# Patient Record
Sex: FEMALE | Race: WHITE | NOT HISPANIC OR LATINO | URBAN - METROPOLITAN AREA
[De-identification: names, ages, dates, MRNs, and addresses within clinical notes are randomized per-mention and may not be internally consistent; named-entity substitution may affect disease eponyms.]

---

## 2017-03-27 ENCOUNTER — OFFICE VISIT - HEALTHEAST (OUTPATIENT)
Dept: FAMILY MEDICINE | Facility: CLINIC | Age: 42
End: 2017-03-27

## 2017-03-27 DIAGNOSIS — G47.00 INSOMNIA: ICD-10-CM

## 2017-03-27 DIAGNOSIS — B07.0 VERRUCA PLANTARIS: ICD-10-CM

## 2017-03-27 DIAGNOSIS — Z30.9 CONTRACEPTIVE MANAGEMENT: ICD-10-CM

## 2017-03-27 DIAGNOSIS — Z00.00 WELL ADULT EXAM: ICD-10-CM

## 2017-03-27 ASSESSMENT — MIFFLIN-ST. JEOR: SCORE: 1141.51

## 2017-04-25 ENCOUNTER — OFFICE VISIT - HEALTHEAST (OUTPATIENT)
Dept: FAMILY MEDICINE | Facility: CLINIC | Age: 42
End: 2017-04-25

## 2017-04-25 DIAGNOSIS — B07.0 VERRUCA PLANTARIS: ICD-10-CM

## 2017-04-25 ASSESSMENT — MIFFLIN-ST. JEOR: SCORE: 1144.41

## 2017-05-16 ENCOUNTER — COMMUNICATION - HEALTHEAST (OUTPATIENT)
Dept: SCHEDULING | Facility: CLINIC | Age: 42
End: 2017-05-16

## 2017-06-02 ENCOUNTER — OFFICE VISIT - HEALTHEAST (OUTPATIENT)
Dept: FAMILY MEDICINE | Facility: CLINIC | Age: 42
End: 2017-06-02

## 2017-06-02 DIAGNOSIS — N92.0 EXCESSIVE OR FREQUENT MENSTRUATION: ICD-10-CM

## 2017-06-02 ASSESSMENT — MIFFLIN-ST. JEOR: SCORE: 1145.77

## 2017-06-14 ENCOUNTER — OFFICE VISIT - HEALTHEAST (OUTPATIENT)
Dept: FAMILY MEDICINE | Facility: CLINIC | Age: 42
End: 2017-06-14

## 2017-06-14 DIAGNOSIS — Z30.017 NEXPLANON INSERTION: ICD-10-CM

## 2017-06-14 ASSESSMENT — MIFFLIN-ST. JEOR: SCORE: 1143.04

## 2017-08-25 ENCOUNTER — COMMUNICATION - HEALTHEAST (OUTPATIENT)
Dept: SCHEDULING | Facility: CLINIC | Age: 42
End: 2017-08-25

## 2017-12-04 ENCOUNTER — OFFICE VISIT - HEALTHEAST (OUTPATIENT)
Dept: FAMILY MEDICINE | Facility: CLINIC | Age: 42
End: 2017-12-04

## 2017-12-04 DIAGNOSIS — Z30.09 GENERAL COUNSELING AND ADVICE FOR CONTRACEPTIVE MANAGEMENT: ICD-10-CM

## 2017-12-04 DIAGNOSIS — Z30.46 ENCOUNTER FOR NEXPLANON REMOVAL: ICD-10-CM

## 2017-12-04 ASSESSMENT — MIFFLIN-ST. JEOR: SCORE: 1145.77

## 2017-12-19 ENCOUNTER — COMMUNICATION - HEALTHEAST (OUTPATIENT)
Dept: SCHEDULING | Facility: CLINIC | Age: 42
End: 2017-12-19

## 2017-12-20 ENCOUNTER — COMMUNICATION - HEALTHEAST (OUTPATIENT)
Dept: FAMILY MEDICINE | Facility: CLINIC | Age: 42
End: 2017-12-20

## 2017-12-20 DIAGNOSIS — R29.898 UPPER EXTREMITY WEAKNESS: ICD-10-CM

## 2018-03-29 ENCOUNTER — OFFICE VISIT - HEALTHEAST (OUTPATIENT)
Dept: FAMILY MEDICINE | Facility: CLINIC | Age: 43
End: 2018-03-29

## 2018-03-29 DIAGNOSIS — Z12.31 ENCOUNTER FOR SCREENING MAMMOGRAM FOR BREAST CANCER: ICD-10-CM

## 2018-03-29 DIAGNOSIS — Z00.00 WELL FEMALE EXAM WITHOUT GYNECOLOGICAL EXAM: ICD-10-CM

## 2018-03-29 DIAGNOSIS — F41.1 GENERALIZED ANXIETY DISORDER: ICD-10-CM

## 2018-03-29 ASSESSMENT — MIFFLIN-ST. JEOR: SCORE: 1145.77

## 2018-05-18 ENCOUNTER — OFFICE VISIT - HEALTHEAST (OUTPATIENT)
Dept: FAMILY MEDICINE | Facility: CLINIC | Age: 43
End: 2018-05-18

## 2018-05-18 DIAGNOSIS — J30.2 SEASONAL ALLERGIES: ICD-10-CM

## 2018-05-18 DIAGNOSIS — N92.0 SPOTTING BETWEEN MENSES: ICD-10-CM

## 2018-05-18 LAB
HCG UR QL: NEGATIVE
SP GR UR STRIP: 1.01 (ref 1–1.03)

## 2018-05-18 ASSESSMENT — MIFFLIN-ST. JEOR: SCORE: 1141.23

## 2018-05-24 ENCOUNTER — COMMUNICATION - HEALTHEAST (OUTPATIENT)
Dept: FAMILY MEDICINE | Facility: CLINIC | Age: 43
End: 2018-05-24

## 2018-05-24 DIAGNOSIS — Z12.83 SKIN CANCER SCREENING: ICD-10-CM

## 2018-05-31 ENCOUNTER — RECORDS - HEALTHEAST (OUTPATIENT)
Dept: MAMMOGRAPHY | Facility: CLINIC | Age: 43
End: 2018-05-31

## 2018-05-31 DIAGNOSIS — Z12.31 ENCOUNTER FOR SCREENING MAMMOGRAM FOR MALIGNANT NEOPLASM OF BREAST: ICD-10-CM

## 2018-09-17 ENCOUNTER — COMMUNICATION - HEALTHEAST (OUTPATIENT)
Dept: FAMILY MEDICINE | Facility: CLINIC | Age: 43
End: 2018-09-17

## 2018-10-04 ENCOUNTER — OFFICE VISIT - HEALTHEAST (OUTPATIENT)
Dept: FAMILY MEDICINE | Facility: CLINIC | Age: 43
End: 2018-10-04

## 2018-10-04 DIAGNOSIS — F41.1 GENERALIZED ANXIETY DISORDER: ICD-10-CM

## 2018-10-04 ASSESSMENT — MIFFLIN-ST. JEOR: SCORE: 1150.3

## 2019-10-27 ENCOUNTER — COMMUNICATION - HEALTHEAST (OUTPATIENT)
Dept: FAMILY MEDICINE | Facility: CLINIC | Age: 44
End: 2019-10-27

## 2019-10-27 DIAGNOSIS — Z00.00 WELL FEMALE EXAM WITHOUT GYNECOLOGICAL EXAM: ICD-10-CM

## 2019-10-28 RX ORDER — NORETHINDRONE ACETATE AND ETHINYL ESTRADIOL .02; 1 MG/1; MG/1
1 TABLET ORAL DAILY
Qty: 30 TABLET | Refills: 0 | Status: SHIPPED | OUTPATIENT
Start: 2019-10-28

## 2020-08-09 ENCOUNTER — COMMUNICATION - HEALTHEAST (OUTPATIENT)
Dept: FAMILY MEDICINE | Facility: CLINIC | Age: 45
End: 2020-08-09

## 2020-08-09 DIAGNOSIS — Z00.00 WELL FEMALE EXAM WITHOUT GYNECOLOGICAL EXAM: ICD-10-CM

## 2021-01-04 ENCOUNTER — HEALTH MAINTENANCE LETTER (OUTPATIENT)
Age: 46
End: 2021-01-04

## 2021-01-18 ENCOUNTER — RECORDS - HEALTHEAST (OUTPATIENT)
Dept: ADMINISTRATIVE | Facility: OTHER | Age: 46
End: 2021-01-18

## 2021-05-30 VITALS — BODY MASS INDEX: 20.32 KG/M2 | HEIGHT: 63 IN | WEIGHT: 114.7 LBS

## 2021-05-30 VITALS — BODY MASS INDEX: 20.21 KG/M2 | WEIGHT: 114.06 LBS | HEIGHT: 63 IN

## 2021-05-31 VITALS — WEIGHT: 114.4 LBS | BODY MASS INDEX: 20.27 KG/M2 | HEIGHT: 63 IN

## 2021-05-31 VITALS — BODY MASS INDEX: 20.38 KG/M2 | WEIGHT: 115 LBS | HEIGHT: 63 IN

## 2021-05-31 VITALS — HEIGHT: 63 IN | WEIGHT: 115 LBS | BODY MASS INDEX: 20.38 KG/M2

## 2021-06-01 VITALS — WEIGHT: 115 LBS | BODY MASS INDEX: 20.38 KG/M2 | HEIGHT: 63 IN

## 2021-06-01 VITALS — HEIGHT: 63 IN | BODY MASS INDEX: 20.2 KG/M2 | WEIGHT: 114 LBS

## 2021-06-02 VITALS — WEIGHT: 116 LBS | HEIGHT: 63 IN | BODY MASS INDEX: 20.55 KG/M2

## 2021-06-02 NOTE — TELEPHONE ENCOUNTER
RN cannot approve Refill Request    RN can NOT refill this medication PCP messaged that patient is overdue for Office Visit. Last office visit: 5/18/2018 Kim Payton MD Last Physical: Visit date not found Last MTM visit: Visit date not found Last visit same specialty: 10/4/2018 Adriana Miguel MD.  Next visit within 3 mo: Visit date not found  Next physical within 3 mo: Visit date not found      Rose Lagunas, [facility] Triage/Med Refill 10/27/2019    Requested Prescriptions   Pending Prescriptions Disp Refills     norethindrone-ethinyl estradiol (MICROGESTIN 1/20) 1-20 mg-mcg per tablet [Pharmacy Med Name: NORETHINDRONE ACET/ETH 1/20 TB 21'S] 84 tablet 0     Sig: TAKE 1 TABLET BY MOUTH DAILY       Oral Contraceptives Protocol Failed - 10/27/2019  3:31 AM        Failed - Visit with PCP or prescribing provider visit in last 12 months      Last office visit with prescriber/PCP: 5/18/2018 Kim Payton MD OR same dept: Visit date not found OR same specialty: 10/4/2018 Adriana Miguel MD  Last physical: Visit date not found Last MTM visit: Visit date not found   Next visit within 3 mo: Visit date not found  Next physical within 3 mo: Visit date not found  Prescriber OR PCP: Kim Payton MD  Last diagnosis associated with med order: There are no diagnoses linked to this encounter.  If protocol passes may refill for 12 months if within 3 months of last provider visit (or a total of 15 months).

## 2021-06-02 NOTE — TELEPHONE ENCOUNTER
1st attempt to contact patient    Left message to call back for: Taylor    Information to relay to patient:  LMTCB      Please advise patient a one month supply of her medication was provided. She is overdue for a physical so please assist patient in scheduling, she will need a visit for additional refills.    Grace Major, CMA

## 2021-06-02 NOTE — TELEPHONE ENCOUNTER
3rd attempt to contact pt I checked her MyChart and she has not read the 2 messages sent or returned our calls would you like to pend a letter for me to send?    Or I can send a quality letter if youd like me to

## 2021-06-03 NOTE — TELEPHONE ENCOUNTER
Patient Returning Call  Reason for call:  Patient called back.  Information relayed to patient:  Informed of message listed below.  Patient states she has new insurance and is going to a different clinic and provider not in [facility] any more.  Patient has additional questions:  No  If YES, what are your questions/concerns:    Okay to leave a detailed message?: No call back needed

## 2021-06-09 NOTE — PROGRESS NOTES
Assessment/ Plan:      1. Well adult exam  Healthy female exam completed today.  Labs were not updated as they were done last year and were within normal limits.  She will not be due for a repeat Pap until 2021.  She was encouraged to continue with healthy lifestyle modifications that she has been participating in.  She will follow-up in 1 year for annual exam or sooner as needed.    2. Contraceptive management  Patient will be started on MonoNessa birth control.  She does not have a medical history of migraines with aura, blood clotting, or family history of blood clotting.  I also discussed with patient that she may consider an IUD or Nexplanon.  She will let me know if she would like to see someone regarding these type of birth control.  I discussed the risks versus benefits of this birth control as well as potential side effects.  - norgestimate-ethinyl estradiol (MONONESSA, 28,) 0.25-35 mg-mcg per tablet; Take 1 tablet by mouth daily.  Dispense: 84 tablet; Refill: 3    3. Insomnia  I discussed with patient that establishing good sleep habits is essential in helping with sleep patterns.  I encouraged her to try to develop a routine and to avoid stimulating activities in the evening before immediately before she goes to bed.  I also encouraged her to try melatonin as needed to help induce sleep I discussed with her to start it at 2 mg and to not go greater than 10 mg.  I also discussed that her memory concerns could also be secondary to poor sleep.  I did discuss that her ability to concentrate is greatly affected by her them sleep that you get on a daily basis.    4. Verruca plantaris  One plantar wart was treated with 3 freeze and thaw cycles of liquid nitrogen.  The area was prepped with a alcohol swab and the callus was removed utilizing a #10 scalpel.  Patient tolerated the procedure well.  I discussed with patient to utilize a pumice stone as well as over-the-counter liquid wart remover and to follow-up in  3-4 weeks for second treatment.    Subjective:      Taylor Birmingham is a 41 y.o. female who presents for an annual exam. The patient is sexually active. The patient participates in regular exercise: yes. The patient reports that there is not domestic violence in her life. She has a couple concerns today. One is her period. She reports that she is having worsening symptoms around her period with ovulation and increase cramping. She denies history of migraines, blood clotting, or family history of blood clotting.    Sleep is also a concern. She reports that she had inconsistent sleeping. This does not occur daily but is bothersome a couple days a week. She is have trouble falling asleep and staying asleep. She has tried to regulate her sleeping habits but admits she is not as good at this as she should be. She has minimal caffiene intake during the day. She does report that the hour before she goes to bed she often will check her work email as well as clean up the house. She has 2 children ages 5 and 2. She has not tried melatonin or antihistamines.   Wart: On the botton of her left great toe. It is painful with walking. She would like this removed if possible.  And finally she has concerns regarding her memory.  She reports that her memory seemed off approximately a couple weeks ago she just did not feel like herself.  She easily forgot different things that recently occurred and she felt that she was in a continuous fog.  She does believe that this could be secondary to being a mom of 2 young kids and not getting consistent sleep. She does report that they have had intermittent upper respiratory infections over the last couple months as well.      Healthy Habits:   Regular Exercise: Yes  Sunscreen Use: Yes  Healthy Diet: Yes  Dental Visits Regularly: Yes  Seat Belt: Yes  Sexually active: Yes  Self Breast Exam Monthly:No  Hemoccults: No  Flex Sig: No  Colonoscopy: No  Lipid Profile: 03/25/16  Glucose Screen:  16  Prevention of Osteoporosis: No  Last Dexa: N/A  Guns at Home:  N/A      Immunization History   Administered Date(s) Administered     Influenza, inj, historic 10/09/2014, 10/01/2016     Tdap 2011, 2015     Immunization status: up to date and documented.    No exam data present    Gynecologic History  Patient's last menstrual period was 2017 (exact date).  Contraception: none  Last Pap: 16. Results were: normal  Last mammogram: NONE. Results were: na      OB History    Para Term  AB SAB TAB Ectopic Multiple Living   3    1 1    1      # Outcome Date GA Lbr Masoud/2nd Weight Sex Delivery Anes PTL Lv   3 SAB            2             1                Birth Comments: System Generated. Please review and update pregnancy details.          Current Outpatient Prescriptions   Medication Sig Dispense Refill     cholecalciferol, vitamin D3, (VITAMIN D3) 1,000 unit capsule Take 1,000 Units by mouth daily.       multivitamin therapeutic (THERAGRAN) tablet Take 1 tablet by mouth daily.       No current facility-administered medications for this visit.      Past Medical History:   Diagnosis Date     Diabetes mellitus     gestational diabetes - diet controlled     Mental disorder     anxiety - no longer taking medication for     Past Surgical History:   Procedure Laterality Date      SECTION, LOW TRANSVERSE  11     [place]  DELIVERY ONLY      Description:  Section;  Recorded: 2013;     Penicillins and Sulfa (sulfonamide antibiotics)  Family History   Problem Relation Age of Onset     No Medical Problems Mother      No Medical Problems Father      No Medical Problems Sister      Cancer Maternal Aunt      Cancer Paternal Aunt      Cancer Maternal Grandmother      ovarian     Heart disease Paternal Grandfather      Social History     Social History     Marital status:      Spouse name: N/A     Number of children: N/A     Years of education:  "N/A     Occupational History     Not on file.     Social History Main Topics     Smoking status: Never Smoker     Smokeless tobacco: Never Used     Alcohol use Yes     Drug use: No     Sexual activity: Yes     Partners: Male     Birth control/ protection: None     Other Topics Concern     Not on file     Social History Narrative       Review of Systems  General:  Denies problem  Eyes: Denies problem  Ears/Nose/Throat: Denies problem  Cardiovascular: Denies problem  Respiratory:  Denies problem  Gastrointestinal:  Denies problem  Genitourinary: Denies problem  Musculoskeletal:  Denies problem  Skin: Denies problem  Neurologic: Denies problem  Psychiatric: Denies problem  Endocrine: Denies problem  Heme/Lymphatic: Denies problem   Allergic/Immunologic: Denies problem        Objective:         Vitals:    03/27/17 0909   BP: 112/72   Pulse: 72   Resp: 16   Temp: 97.9  F (36.6  C)   TempSrc: Oral   Weight: 114 lb 1 oz (51.7 kg)   Height: 5' 3\" (1.6 m)     Body mass index is 20.21 kg/(m^2).    Physical Exam:  General Appearance: Alert, cooperative, no distress, appears stated age  Head: Normocephalic, without obvious abnormality, atraumatic  Eyes: PERRL, conjunctiva/corneas clear, EOM's intact  Ears: Normal TM's and external ear canals, both ears  Nose: Nares normal, septum midline,mucosa normal, no drainage  Throat: Lips, mucosa, and tongue normal; teeth and gums normal  Neck: Supple, symmetrical, trachea midline, no adenopathy;  thyroid: not enlarged, symmetric, no tenderness/mass/nodules  Back: Symmetric, no curvature, ROM normal, no CVA tenderness  Lungs: Clear to auscultation bilaterally, respirations unlabored  Breasts: No breast masses, tenderness, asymmetry, or nipple discharge.  Heart: Regular rate and rhythm, S1 and S2 normal, no murmur, rub, or gallop  Abdomen: Soft, non-tender, bowel sounds active all four quadrants,  no masses, no organomegaly  Pelvic:Not examined  Extremities: Extremities normal, atraumatic, " no cyanosis or edema. Small plantar wart on the left great toe is present. Thick callus is present over the wart.   Skin: Skin color, texture, turgor normal, no rashes or lesions  Lymph nodes: Cervical, supraclavicular, and axillary nodes normal  Neurologic: Normal

## 2021-06-10 ENCOUNTER — RECORDS - HEALTHEAST (OUTPATIENT)
Dept: ADMINISTRATIVE | Facility: OTHER | Age: 46
End: 2021-06-10

## 2021-06-10 NOTE — TELEPHONE ENCOUNTER
RN cannot approve Refill Request    RN can NOT refill this medication Protocol failed and NO refill given. Last office visit: 4/25/2017 Lexus Saldivar CNP Last Physical: 3/29/2018 Last MTM visit: Visit date not found Last visit same specialty: 10/4/2018 Adriana Miguel MD.  Next visit within 3 mo: Visit date not found  Next physical within 3 mo: Visit date not found      Danni Chauhan, [facility] Triage/Med Refill 8/10/2020    Requested Prescriptions   Pending Prescriptions Disp Refills     norethindrone-ethinyl estradiol (MICROGESTIN 1/20) 1-20 mg-mcg per tablet [Pharmacy Med Name: NORETHINDRONE ACET/ETH 1/20 TB 21'S] 21 tablet 0     Sig: TAKE 1 TABLET BY MOUTH DAILY       Oral Contraceptives Protocol Failed - 8/9/2020  1:03 PM        Failed - Visit with PCP or prescribing provider visit in last 12 months      Last office visit with prescriber/PCP: 4/25/2017 Lexus Saldivar CNP OR same dept: Visit date not found OR same specialty: 10/4/2018 Adriana Miguel MD  Last physical: 3/29/2018 Last MTM visit: Visit date not found   Next visit within 3 mo: Visit date not found  Next physical within 3 mo: Visit date not found  Prescriber OR PCP: Lexus Saldivar CNP  Last diagnosis associated with med order: 1. Well female exam without gynecological exam  - norethindrone-ethinyl estradiol (MICROGESTIN 1/20) 1-20 mg-mcg per tablet [Pharmacy Med Name: NORETHINDRONE ACET/ETH 1/20 TB 21'S]; TAKE 1 TABLET BY MOUTH DAILY  Dispense: 21 tablet; Refill: 0    If protocol passes may refill for 12 months if within 3 months of last provider visit (or a total of 15 months).

## 2021-06-10 NOTE — PROGRESS NOTES
"  Assessment/Plan:       1. Verruca plantaris  I personally 2 verruca plantaris were treated on the left great toe with 3 freeze thaw cycles with liquid nitroglycerin.  Area was prepped with alcohol swab and callus was removed with #10 blade scalpel. She tolerated procedure well.  I discussed follow-up with the patient.   1. Recommend allowing any blistering to heal in the next 3-5 days.   2. After any blistering is healed, recommend soaking the feet in warm water for 5 minutes daily.  3. Then use a pumice stone to scrape off the callous.  4. Then apply Salicylic acid (wart remover treatment) daily   5. Return to clinic in 3-4 weeks if not improving or worsening.        Subjective:      Taylor Birmingham is a 41 y.o. female who presents for plantar wart on left great toe. This was treated on 3/27/17 with 3 freeze thaw cycles.  She returns tonight to have this treated again.  She has been using a pumice stone to help decrease the callus as well as assuring good foot care.  She has also been utilizing salicylic acid.  No other concerns today.    The following portions of the patient's history were reviewed and updated as appropriate: allergies, current medications and problem list.    Review of Systems   Pertinent items are noted in HPI.      Objective:      /70 (Patient Site: Right Arm, Patient Position: Sitting, Cuff Size: Adult Regular)  Pulse 60  Resp 16  Ht 5' 3\" (1.6 m)  Wt 114 lb 11.2 oz (52 kg)  LMP 03/12/2017 (Exact Date)  BMI 20.32 kg/m2    General appearance: alert, appears stated age and cooperative  Skin: 2 verruca plantaris present on left great toe.  These were treated with 3 freeze thaw cycles after prepping the area with alcohol and using a #10 blade to remove the callus.  No other skin lesions present. no rashes.  "

## 2021-06-11 NOTE — PROGRESS NOTES
SUBJECTIVE:    HPI:    41 y.o. female presenting today for Nexplanon insertion.     She is right hand dominant.  She is  and has children.  She is not breastfeeding.  Current contraception: none  Last sexual activity: She denies unprotected sexual activity in past 2 weeks.     PMH:  No known contraindications to Nexplanon      No current or past history of thrombosis or thromboembolic disorders       No hepatic tumors or active liver disease       No undiagnosed abnormal genital bleeding       No known or suspected carcinoma of the breast or personal history of breast cancer       No hypersensitivity to any of the components of NEXPLANON      No known history of keloids    ROS: 10 point review of symptoms otherwise negative except as detailed in HPI.     OBJECTIVE:    LABS: UPT NEG (contraception start)      ASSESSMENT:   We reviewed the Nexplanon literature and counseling re full range of contraceptive options. She denies any contraindications to its use. We discussed that her bleeding profile will change. We discussed ACHES and encouraged her to call with any of those signs or symptoms. She is aware of 3 year effectiveness of this method.  She is aware of potential side effects including infection at site, intermenstrual bleeding, irregular bleeding, amenorrhea, need for minor surgical procedure to remove or more extensive if implant is deep    Appropriate candidate for Nexplanon    PLAN & PROCEDURE NOTE:  She elected to proceed with the placement after the risks and benefits were discussed. Denies allergy to local anesthesia, keloid formation.     Consent signed and will be scanned in EMR.     After cleansing left (non-dominant) arm above the elbow, 2 mL of 1% Lidocaine was administered along the planned injection site for Nexplanon. Insertion site cleansed with iodine. Nexplanon was then inserted 6cm above the medial epicondyle of the humerus, in the groove between the biceps and the triceps (sulcus  bicipitalis medialis). Palpation revealed subdermal placement; the patient was able to feel implant as well.     Bleeding was minimal and a pressure dressing was applied with instructions to leave this in place for 24 hours. Pt was instructed to observe for signs/symptoms of any infection (localized tenderness, pain, inflammation) or excessive bruising.  No apparent distress at completion of procedure. No complications.     Nexplanon LOT #: [id number]  Exp 6/14/2020  NDC# [id number]    Established patient Nexplanon insertion

## 2021-06-11 NOTE — PROGRESS NOTES
"  Subjective:       Taylor Birmingham is a 41 y.o. female who presents for the discussion of contraceptive options.  She previously had some discussion with my partner Lexus Saldivar, is most interested in hearing about Nexplanon arm implant.  She describes pain with ovulation along with back pain and significant cramping with her menses.  Essentially she is in pain every 2 weeks.  She desires to be estrogen-free in terms of contraception if possible.  She has no personal or family history of blood clots.  She would like a method that she does not need to remember on a regular basis.  Lastly, she is worried that she may have a retained tampon in place.  She is wondering if we can take a look at this today.  She denies any pain, itching burning or foul discharge.    The following portions of the patient's history were reviewed and updated as appropriate: allergies, current medications and problem list.    Review of Systems   Pertinent items are noted in HPI.      Objective:      /68 (Patient Site: Left Arm, Patient Position: Sitting, Cuff Size: Adult Regular)  Pulse 64  Resp 16  Ht 5' 3\" (1.6 m)  Wt 115 lb (52.2 kg)  LMP 05/31/2017  Breastfeeding? No  BMI 20.37 kg/m2    General appearance: alert, appears stated age and cooperative  Pelvic: cervix normal in appearance, external genitalia normal, no adnexal masses or tenderness, no cervical motion tenderness, rectovaginal septum normal, uterus normal size, shape, and consistency and vagina normal without discharge        Assessment/Plan:      Menorrhagia  Discussed multiple options for contraception for both contraception and for control of her menses.  She was given brochures about Nexplanon and intrauterine devices.  She will think about this further and schedule placement with the midwives if desired.  No retained tampon is found today on examination, patient was reassured.        "

## 2021-06-14 NOTE — PROGRESS NOTES
"  Assessment/Plan:       1. Encounter for Nexplanon removal  After a discussion of risks and benefits, patient wishes to have Nexplanon removed.  Area over the distal aspect of the impant was cleansed with rubbing alcohol and anesthetized using 1 cc of 2% Xylocaine with epinephrine.  Area was then cleansed with betadine and an approximately 2 mm incision made with a scalpel.  Distal aspect of the implant was then manipulated to extrude from this opening and dissected from its fibrous tissue capsule.  The implant was then removed whole without difficulty.  Incision was covered with a steri strip.  Patient tolerated the procedure well and there were no immediate complications.    2. General counseling and advice for contraceptive management  After a discussion of risks and benefits of multiple methods, patient wishes to try a low-dose oral contraceptive pill.  Rx was sent to pharmacy.        Subjective:       Taylor Birmingham is a 42 y.o. female who presents for primarily Nexplanon arm implant removal.  She would also like to discuss other options for contraception.  Patient had this placed in June, and reports consistent spotting since placement.  She also has been having \"mood swings\" and irritability.  She has continued with regular menses since placement also.      The following portions of the patient's history were reviewed and updated as appropriate: allergies, current medications, past medical history, past social history and problem list.      Current Outpatient Prescriptions:      cholecalciferol, vitamin D3, (VITAMIN D3) 1,000 unit capsule, Take 1,000 Units by mouth daily., Disp: , Rfl:      multivitamin therapeutic (THERAGRAN) tablet, Take 1 tablet by mouth daily., Disp: , Rfl:      norethindrone-ethinyl estradiol (MICROGESTIN 1/20) 1-20 mg-mcg per tablet, Take 1 tablet by mouth daily., Disp: 84 tablet, Rfl: 3    Review of Systems   Pertinent items are noted in HPI.      Objective:      /74 (Patient " "Site: Left Arm, Patient Position: Sitting, Cuff Size: Adult Regular)  Pulse 68  Resp 16  Ht 5' 3\" (1.6 m)  Wt 115 lb (52.2 kg)  LMP 11/06/2017  Breastfeeding? No  BMI 20.37 kg/m2    General appearance: alert, appears stated age and cooperative  Head: Normocephalic, without obvious abnormality, atraumatic  Eyes: conjunctivae clear, sclerae anicteric  Extremities: extremities normal, atraumatic, no cyanosis or edema          "

## 2021-06-15 PROBLEM — G47.00 INSOMNIA: Status: ACTIVE | Noted: 2017-03-27

## 2021-06-16 PROBLEM — J30.2 SEASONAL ALLERGIES: Status: ACTIVE | Noted: 2018-05-18

## 2021-06-17 NOTE — PROGRESS NOTES
Assessment/ Plan:      1. Well female exam without gynecological exam  Well female exam completed today. Labs were reviewed from previous visits and were WNL. No new labs needed today as there has not been any significant change in health. I encouraged her to continue with her healthy lifestyle modifications. I encouraged her to start getting regular cardiovascular exercise as this will improve her overall health as well as help with managing her anxiety. I discussed self breast exams and how to complete these. Discussed safety and other preventative care measures as well. Plan following up yearly or sooner as needed.     2. Encounter for screening mammogram for breast cancer  Screening mammo order today.   - Mammo Screening Bilateral; Future    3. Generalized anxiety disorder  Patient seems to be experiencing a low level of anxiety possibly due to SAD. I discussed self care and encouraged this before starting a medication. If she wishes to speak to a counselor I would place the referral for her. At this time she does not require the referral. She is in agreement with this plan and will let me know if she needs any further assistance or wishes to discuss the symptoms further.       Subjective:       Taylor Birmingham is a 42 y.o. female who presents for an annual exam.  The patient is sexually active. The patient participates in regular exercise: no. The patient reports that there is not domestic violence in her life. Overall feeling ok. She has had intermittent insomnia and anxiety type symptoms. Recent episode that was weird with neuro symptoms. She was evaluated by neurology and nothing abnormal found.  No other symptoms are present and no new occurences. She also mentions that she has had recent increase in her anxiety. She is not sure if it is seasonal however, she mentions that she has had low level of anxiety that at times causes her to loose focus. She mentions that she is going to work on self care and if  needed she may go back to her counselor. She would like to monitor her symptoms for now but if needed a referral to counseling in the future may be required.     Healthy Habits:   Regular Exercise: No  Sunscreen Use: Yes  Healthy Diet: Yes  Dental Visits Regularly: Yes  Seat Belt: Yes  Sexually active: Yes  Self Breast Exam Monthly:No  Hemoccults: No  Flex Sig: No  Colonoscopy: No  Lipid Profile: Yes  Glucose Screen: Yes  Prevention of Osteoporosis: Yes  Last Dexa: N/A  Guns at Home:  No         Gynecologic History  Patient's last menstrual period was 2018.  Contraception: oral progesterone-only contraceptive  Last Pap: 2016. Results were: normal  Last mammogram: n/a. Results were: n/a    OB History    Para Term  AB Living   3    1 1   SAB TAB Ectopic Multiple Live Births   1          # Outcome Date GA Lbr Masoud/2nd Weight Sex Delivery Anes PTL Lv   3 SAB            2             1                Birth Comments: System Generated. Please review and update pregnancy details.          Current Outpatient Prescriptions   Medication Sig Dispense Refill     cholecalciferol, vitamin D3, (VITAMIN D3) 1,000 unit capsule Take 1,000 Units by mouth daily.       multivitamin therapeutic (THERAGRAN) tablet Take 1 tablet by mouth daily.       norethindrone-ethinyl estradiol (MICROGESTIN 1/20) 1-20 mg-mcg per tablet Take 1 tablet by mouth daily. 84 tablet 3     No current facility-administered medications for this visit.      Past Medical History:   Diagnosis Date     Diabetes mellitus     gestational diabetes - diet controlled     Gestational Diabetes Mellitus - Antepartum Condition Or Prior Complicated Delivery     Created by Conversion      Mental disorder     anxiety - no longer taking medication for     , delivered 3/6/2015     Past Surgical History:   Procedure Laterality Date      SECTION, LOW TRANSVERSE  11     [place]  DELIVERY ONLY      Description:  Section;   "Recorded: 06/16/2013;     Penicillins and Sulfa (sulfonamide antibiotics)  Family History   Problem Relation Age of Onset     No Medical Problems Mother      No Medical Problems Father      No Medical Problems Sister      Cancer Maternal Aunt      Cancer Paternal Aunt      Cancer Maternal Grandmother      ovarian     Heart disease Paternal Grandfather      Social History     Social History     Marital status:      Spouse name: N/A     Number of children: N/A     Years of education: N/A     Occupational History     Not on file.     Social History Main Topics     Smoking status: Never Smoker     Smokeless tobacco: Never Used     Alcohol use Yes     Drug use: No     Sexual activity: Yes     Partners: Male     Birth control/ protection: None     Other Topics Concern     Not on file     Social History Narrative       Review of Systems  General:  Denies problem  Eyes: Denies problem  Ears/Nose/Throat: Denies problem  Cardiovascular: Denies problem  Respiratory:  Denies problem  Gastrointestinal:  Denies problem, Genitourinary: Denies problem  Musculoskeletal:  Denies problem  Skin: Denies problem  Neurologic: Denies problem  Psychiatric: Denies problem  Endocrine: Denies problem  Heme/Lymphatic: Denies problem   Allergic/Immunologic: Denies problem         Objective:         Vitals:    03/29/18 1623   BP: 120/86   Pulse: 100   Resp: 16   Weight: 115 lb (52.2 kg)   Height: 5' 3\" (1.6 m)       Physical Exam:  General Appearance: Alert, cooperative, no distress, appears stated age  Head: Normocephalic, without obvious abnormality, atraumatic  Eyes: PERRL, conjunctiva/corneas clear, EOM's intact  Ears: Normal TM's and external ear canals, both ears  Nose: Nares normal, septum midline,mucosa normal, no drainage  Throat: Lips, mucosa, and tongue normal; teeth and gums normal  Neck: Supple, symmetrical, trachea midline, no adenopathy;  thyroid: not enlarged, symmetric, no tenderness/mass/nodules; no carotid bruit or " JVD  Back: Symmetric, no curvature, ROM normal, no CVA tenderness  Lungs: Clear to auscultation bilaterally, respirations unlabored  Breasts: No breast masses, tenderness, asymmetry, or nipple discharge.  Heart: Regular rate and rhythm, S1 and S2 normal, no murmur, rub, or gallop, Abdomen: Soft, non-tender, bowel sounds active all four quadrants,  no masses, no organomegaly  Pelvic:declined today. No concerns. Denies abnormalities or changes.   Extremities: Extremities normal, atraumatic, no cyanosis or edema  Skin: Skin color, texture, turgor normal, no rashes or lesions  Lymph nodes: Cervical, supraclavicular, and axillary nodes normal  Neurologic: Normal     Results for orders placed or performed during the hospital encounter of 12/19/17   Basic Metabolic Panel   Result Value Ref Range    Sodium 141 136 - 145 mmol/L    Potassium 3.5 3.5 - 5.0 mmol/L    Chloride 106 98 - 107 mmol/L    CO2 22 22 - 31 mmol/L    Anion Gap, Calculation 13 5 - 18 mmol/L    Glucose 97 70 - 125 mg/dL    Calcium 9.4 8.5 - 10.5 mg/dL    BUN 7 (L) 8 - 22 mg/dL    Creatinine 0.75 0.60 - 1.10 mg/dL    GFR MDRD Af Amer >60 >60 mL/min/1.73m2    GFR MDRD Non Af Amer >60 >60 mL/min/1.73m2   Magnesium   Result Value Ref Range    Magnesium 1.8 1.8 - 2.6 mg/dL   HCG, Qual   Result Value Ref Range    Beta hCG Qualitative Negative Negative   Thyroid Cascade   Result Value Ref Range    TSH 1.88 0.30 - 5.00 uIU/mL   HM1 (CBC with Diff)   Result Value Ref Range    WBC 7.3 4.0 - 11.0 thou/uL    RBC 4.37 3.80 - 5.40 mill/uL    Hemoglobin 14.1 12.0 - 16.0 g/dL    Hematocrit 40.7 35.0 - 47.0 %    MCV 93 80 - 100 fL    MCH 32.3 27.0 - 34.0 pg    MCHC 34.6 32.0 - 36.0 g/dL    RDW 12.0 11.0 - 14.5 %    Platelets 324 140 - 440 thou/uL    MPV 9.7 8.5 - 12.5 fL    Neutrophils % 62 50 - 70 %    Lymphocytes % 31 20 - 40 %    Monocytes % 6 2 - 10 %    Eosinophils % 0 0 - 6 %    Basophils % 1 0 - 2 %    Neutrophils Absolute 4.5 2.0 - 7.7 thou/uL    Lymphocytes  Absolute 2.3 0.8 - 4.4 thou/uL    Monocytes Absolute 0.4 0.0 - 0.9 thou/uL    Eosinophils Absolute 0.0 0.0 - 0.4 thou/uL    Basophils Absolute 0.1 0.0 - 0.2 thou/uL

## 2021-06-18 NOTE — PROGRESS NOTES
Assessment / Impression     1. Seasonal allergies     2. Spotting between menses  Pregnancy (Beta-hCG, Qual), Urine         Plan:     Seasonal allergies: Given history, as well as findings on exam, suspect symptoms more likely related to allergies, though it is possible she may have URI on top of allergies.  For now, would recommend treatment with daily antihistamine such as loratadine.  We will also prescribe fluticasone nasal spray 2 sprays in each nostril daily.  If needed, she may use nasal decongestion such as pseudoephedrine, and also counseled patient regarding using nasal saline rinses 2-3 times per week.  Discussed utility of allergy testing, at this time, can hold off, however symptoms are persisting, consider referral to allergy for further evaluation at that time.    Spotting between menses, possible breakthrough bleeding due to her OCP: Reviewed negative pregnancy test with patient today.  Discussed with her that she is on the lower dose of progesterone with her OCP, which may be causing the breakthrough bleeding.  We discussed the options of increasing the Microgestin to 1.5/30, the patient was concerned about her mood.  We also discussed possibly switching to a triphasic, though she may still have some breakthrough bleeding with the Ortho Tri-Cyclen Lo, and patient would prefer this, therefore she will finish this current pack and start Ortho Tri-Cyclen Lo.  Did discuss with patient that it may take a few months for her menses to normalize.  We also discussed other options such as trying NuvaRing, she needs a lower dose of estrogen and progesterone.    Follow-up as needed.    Subjective:      HPI: Taylor Birmingham is a 42 y.o. female, new to me who presents for possible cold versus allergy symptoms, as well as concerns regarding breakthrough bleeding.    Patient states that for the last few years around springtime, she has noted rhinorrhea and nasal congestion.  For this year, for the last 10 days,  "she has noted nasal congestion, rhinorrhea, and \"agitated throat\" where she feels that there is some postnasal drip.  She is also noted a cough, that was initially dry though now productive.  She has not had any fevers, though last night did wake up in the middle night feeling sweaty.  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  Her  has seasonal allergies, so she tried over the counter loratadine the last 3 nights, which she did not feel helped her symptoms, though she complains mostly of postnasal drip symptoms when she is lying down at night.    3 nights ago, she also noted breakthrough bleeding.  It is not heavy, and most notable mostly in the morning, though she does complain of backache.  She had Nexplanon removed in 2017 due to noting significant mood changes with hormone, and started Microgestin  in January.  She did have some spotting the first month, though normal.  Since then until this past Tuesday.  She is currently on the second week of her pills.  She has taken ibuprofen which has helped with her symptoms.  Of note, patient does know she has a fibroid within her uterus as well.      Medical History:     Patient Active Problem List   Diagnosis     Generalized Anxiety Disorder     Menorrhagia     Insomnia     Seasonal allergies       Past Medical History:   Diagnosis Date     Diabetes mellitus     gestational diabetes - diet controlled     Gestational Diabetes Mellitus - Antepartum Condition Or Prior Complicated Delivery     Created by Conversion      Mental disorder     anxiety - no longer taking medication for     , delivered 3/6/2015       Past Surgical History:   Procedure Laterality Date      SECTION, LOW TRANSVERSE  11     [place]  DELIVERY ONLY      Description:  Section;  Recorded: 2013;       Current Medications:     Current Outpatient Prescriptions   Medication Sig     multivitamin therapeutic (THERAGRAN) tablet Take " "1 tablet by mouth daily.     cholecalciferol, vitamin D3, (VITAMIN D3) 1,000 unit capsule Take 1,000 Units by mouth daily.     fluticasone (FLONASE) 50 mcg/actuation nasal spray 2 sprays into each nostril daily.     norgestimate-ethinyl estradiol (ORTHO TRI-CYCLEN LO, 28,) 0.18/0.215/0.25 mg-25 mcg Tab tablet Take 1 tablet by mouth daily.       Family History:     Family History   Problem Relation Age of Onset     No Medical Problems Mother      No Medical Problems Father      No Medical Problems Sister      Cancer Maternal Aunt      Cancer Paternal Aunt      Cancer Maternal Grandmother      ovarian     Heart disease Paternal Grandfather        Review of Systems  All other systems reviewed and are negative.         Social History:     History   Smoking Status     Never Smoker   Smokeless Tobacco     Never Used     Social History     Social History Narrative   She works in elementary school.      Objective:     /84 (Patient Site: Right Arm, Patient Position: Sitting, Cuff Size: Adult Regular)  Pulse 74  Temp 98.4  F (36.9  C) (Oral)   Resp 18  Ht 5' 3\" (1.6 m)  Wt 114 lb (51.7 kg)  LMP 04/28/2018 (Approximate)  Breastfeeding? No  BMI 20.19 kg/m2  Physical Examination: General appearance - alert, well appearing, and in no distress  Eyes: pupils equal and reactive, extraocular eye movements intact  Ears: normal external ears, clear canals,  TMs appear normal, with no redness or bulging noted.  Nose: Moderate hypertrophy with pale appearance bilaterally.  Sinus: no tenderness to palpation of frontal or maxillary sinuses bilaterally  Mouth: mucous membranes moist, pharynx normal without lesions  Neck: supple, no significant adenopathy or thyromegaly  Lungs: clear to auscultation, no wheezes, rales or rhonchi, symmetric air entry  Heart: normal rate, regular rhythm, normal S1, S2, no murmurs.  Abdomen: soft, nontender, nondistended, no masses or organomegaly  Neurological: alert, oriented, normal speech, no " focal findings or movement disorder noted.    Extremities: No edema, no clubbing or cyanosis  Psychiatric: Normal affect. Does not appear anxious or depressed.    Recent Results (from the past 168 hour(s))   Pregnancy (Beta-hCG, Qual), Urine   Result Value Ref Range    Pregnancy Test, Urine Negative Negative    Specific Gravity, UA 1.010 1.001 - 1.030         Kim Payton MD  5/18/2018  4:36 PM

## 2021-06-19 NOTE — LETTER
Letter by Kim Payton MD at      Author: Kim Payton MD Service: -- Author Type: --    Filed:  Encounter Date: 10/27/2019 Status: Signed       Taylor Birmingham  [id number] [address] [facility] [address]    November 4, 2019    Dear Taylor    In reviewing your records, we have determined a gap in your preventive services. Based on your age and health history, we recommend the follow service.     ? General Physical  ? Physical with a Pap Smear  ? Cholesterol test  ? Lab work  ? Med check    If you have had the service elsewhere, please contact us so we can update our records. Please let us know if you have transferred your care to another clinic.    Please call [phone] to schedule this appointment.    We believe that a strong preventive care program, including regular physicals and follow-up care is an important part of a healthy lifestyle and we are committed to helping you maintain your health.    Thank you for choosing us as your health care provider.    Sincerely,   [facility]  [address]  [facility] [address]  Phone Number:  [phone]

## 2021-06-20 NOTE — PROGRESS NOTES
"Assessment / Impression     1. Generalized anxiety disorder         Plan:     1.  Discussed the common nature of this condition.  We discussed risks and benefits of medication management.  Patient would like to try medication therapy.  She will continue with her healthy lifestyle modification and consider counseling again.  Will start patient on Zoloft 50 mg once a day.  We discussed how she can take half a tablet for 1 week to adjust to any side effects before taking 1 full tablet.  Encouraged follow-up in 6 weeks.  We discussed possibly trying an E-visit with her primary care provider to do a quick check in given she does not live locally and is having a job change right around this time.     Subjective:      HPI: Taylor Birmingham is a 43 y.o. female with symptoms of anxiety is here today for evaluation.  This is an otherwise healthy 43-year-old female who states for the past 10 years she has had generalized anxiety disorder.  She states she had seen a counselor approximate 5 years ago for a couple of months that helped and was able to work on lifestyle modification to cope with her symptoms throughout the rest of the time.  She states she also remembers that she was given a prescription for Lorazepam at one point in time but does not have any other details.  She is never been on any other medications for depression or anxiety.  She denies any history of depression.  She states she has had some childcare issues with her 2 children, adopted a pet, and has a new job in 1 month that is adding to her anxiety.  She denies any drug or alcohol use.  She does report problems with sleeping and does take melatonin 5 mg at night.  She denies any hallucinations delusions suicidal or homicidal ideations.  No other questions or concerns.      Review of Systems  Pertinent items are noted in HPI.       Objective:     /82  Pulse 72  Resp 16  Ht 5' 3\" (1.6 m)  Wt 116 lb (52.6 kg)  LMP 09/17/2018  BMI 20.55 " kg/m2  Physical Examination: General appearance - alert, well appearing, and in no distress  Psych: appropriate affective, answers all of my questions appropriately. No hallucinations, delusion, or suicidal ideations.  The rest of the entire 30 minute visit greater than 50% of our visit was spent face to face counseling medication management options for generalized anxiety disorder.

## 2021-08-15 ENCOUNTER — HEALTH MAINTENANCE LETTER (OUTPATIENT)
Age: 46
End: 2021-08-15

## 2021-10-11 ENCOUNTER — HEALTH MAINTENANCE LETTER (OUTPATIENT)
Age: 46
End: 2021-10-11

## 2022-01-30 ENCOUNTER — HEALTH MAINTENANCE LETTER (OUTPATIENT)
Age: 47
End: 2022-01-30

## 2022-09-25 ENCOUNTER — HEALTH MAINTENANCE LETTER (OUTPATIENT)
Age: 47
End: 2022-09-25

## 2023-05-13 ENCOUNTER — HEALTH MAINTENANCE LETTER (OUTPATIENT)
Age: 48
End: 2023-05-13

## 2023-10-14 ENCOUNTER — HEALTH MAINTENANCE LETTER (OUTPATIENT)
Age: 48
End: 2023-10-14